# Patient Record
Sex: MALE | Race: BLACK OR AFRICAN AMERICAN | Employment: FULL TIME | URBAN - METROPOLITAN AREA
[De-identification: names, ages, dates, MRNs, and addresses within clinical notes are randomized per-mention and may not be internally consistent; named-entity substitution may affect disease eponyms.]

---

## 2017-03-06 ENCOUNTER — HOSPITAL ENCOUNTER (OUTPATIENT)
Dept: GENERAL RADIOLOGY | Age: 57
Discharge: HOME | End: 2017-03-06
Attending: INTERNAL MEDICINE
Payer: COMMERCIAL

## 2017-03-06 DIAGNOSIS — R05.9 COUGH: ICD-10-CM

## 2017-03-06 PROCEDURE — 71020 XR CHEST PA LAT: CPT

## 2017-03-06 NOTE — PROGRESS NOTES
Please call patient with lab results. CXR looked ok- no pneumonia. .  Continue current care. Rest, hydration, symptomatic treatment. RTO for worsening symptoms.

## 2017-09-01 ENCOUNTER — HOSPITAL ENCOUNTER (OUTPATIENT)
Dept: GENERAL RADIOLOGY | Age: 57
Discharge: HOME | End: 2017-09-01
Attending: INTERNAL MEDICINE
Payer: COMMERCIAL

## 2017-09-01 DIAGNOSIS — M25.561 ACUTE PAIN OF RIGHT KNEE: ICD-10-CM

## 2017-09-01 PROCEDURE — 73565 X-RAY EXAM OF KNEES: CPT

## 2018-08-14 PROBLEM — E78.5 HYPERLIPIDEMIA: Status: ACTIVE | Noted: 2018-08-14
